# Patient Record
Sex: MALE | URBAN - METROPOLITAN AREA
[De-identification: names, ages, dates, MRNs, and addresses within clinical notes are randomized per-mention and may not be internally consistent; named-entity substitution may affect disease eponyms.]

---

## 2018-12-19 ENCOUNTER — APPOINTMENT (RX ONLY)
Dept: URBAN - METROPOLITAN AREA CLINIC 282 | Facility: CLINIC | Age: 73
Setting detail: DERMATOLOGY
End: 2018-12-19

## 2018-12-19 PROBLEM — C44.319 BASAL CELL CARCINOMA OF SKIN OF OTHER PARTS OF FACE: Status: ACTIVE | Noted: 2018-12-19

## 2018-12-19 PROCEDURE — 17311 MOHS 1 STAGE H/N/HF/G: CPT

## 2018-12-19 PROCEDURE — ? MOHS SURGERY

## 2024-11-06 NOTE — PROCEDURE: MOHS SURGERY
Clinical Pharmacy Note  Medication Counseling    Reviewed new medications started during hospital admission: Midodrine. Indications and side effects were emphasized during counseling.  All medication-related questions addressed.  Patient verbalized understanding of education.    Should the patient express any additional questions or concerns regarding their medications, please do not hesitate to contact the pharmacy department.    Patient/caregiver aware they may refuse medications during hospital stay.       5 minutes spent educating patient regarding medications.  Juan Lan [facility], 11/6/2024 1:19 PM     Hemostasis: Electrocautery